# Patient Record
Sex: FEMALE | Race: BLACK OR AFRICAN AMERICAN | NOT HISPANIC OR LATINO | Employment: FULL TIME | URBAN - METROPOLITAN AREA
[De-identification: names, ages, dates, MRNs, and addresses within clinical notes are randomized per-mention and may not be internally consistent; named-entity substitution may affect disease eponyms.]

---

## 2017-05-02 PROBLEM — K21.9 GASTROESOPHAGEAL REFLUX DISEASE: Status: ACTIVE | Noted: 2017-05-02

## 2017-09-05 PROBLEM — M19.90 ARTHRITIS: Status: ACTIVE | Noted: 2017-09-05

## 2018-01-10 ENCOUNTER — CLINICAL SUPPORT (OUTPATIENT)
Dept: OCCUPATIONAL MEDICINE | Facility: CLINIC | Age: 63
End: 2018-01-10

## 2018-01-10 DIAGNOSIS — Z02.83 ENCOUNTER FOR DRUG SCREENING: ICD-10-CM

## 2018-01-10 PROCEDURE — 80305 DRUG TEST PRSMV DIR OPT OBS: CPT | Mod: S$GLB,,, | Performed by: PHYSICIAN ASSISTANT

## 2018-03-22 PROBLEM — R20.2 TINGLING: Status: ACTIVE | Noted: 2018-03-22

## 2018-12-22 ENCOUNTER — OFFICE VISIT (OUTPATIENT)
Dept: URGENT CARE | Facility: CLINIC | Age: 63
End: 2018-12-22

## 2018-12-22 VITALS
HEIGHT: 62 IN | SYSTOLIC BLOOD PRESSURE: 133 MMHG | BODY MASS INDEX: 40.85 KG/M2 | TEMPERATURE: 99 F | WEIGHT: 222 LBS | RESPIRATION RATE: 18 BRPM | OXYGEN SATURATION: 97 % | DIASTOLIC BLOOD PRESSURE: 70 MMHG | HEART RATE: 88 BPM

## 2018-12-22 DIAGNOSIS — J45.20 INTERMITTENT ASTHMA WITHOUT COMPLICATION, UNSPECIFIED ASTHMA SEVERITY: ICD-10-CM

## 2018-12-22 DIAGNOSIS — J40 BRONCHITIS: Primary | ICD-10-CM

## 2018-12-22 PROCEDURE — 99214 OFFICE O/P EST MOD 30 MIN: CPT | Mod: S$GLB,,, | Performed by: NURSE PRACTITIONER

## 2018-12-22 RX ORDER — BENZONATATE 200 MG/1
200 CAPSULE ORAL 3 TIMES DAILY PRN
Qty: 30 CAPSULE | Refills: 0 | Status: SHIPPED | OUTPATIENT
Start: 2018-12-22 | End: 2019-01-01

## 2018-12-22 RX ORDER — PROMETHAZINE HYDROCHLORIDE AND DEXTROMETHORPHAN HYDROBROMIDE 6.25; 15 MG/5ML; MG/5ML
5 SYRUP ORAL NIGHTLY PRN
Qty: 118 ML | Refills: 0 | Status: SHIPPED | OUTPATIENT
Start: 2018-12-22 | End: 2019-01-01

## 2018-12-22 RX ORDER — METHYLPREDNISOLONE 4 MG/1
TABLET ORAL
Qty: 1 PACKAGE | Refills: 0 | Status: SHIPPED | OUTPATIENT
Start: 2018-12-22 | End: 2020-03-21 | Stop reason: CLARIF

## 2018-12-22 NOTE — PATIENT INSTRUCTIONS
Follow up with your doctor in a few days.  Return to the urgent care or go to the ER if symptoms get worse.    CONTINUE ALBUTEROL NEBULIZER AND RESCUE INHALER AS DIRECTED.  START ORAL STEROIDS TOMORROW.  START USING ADVAIR DAILY AS DIRECTED.  MUCINEX DM (OR A MEDICINE WITH  GUAIFENESIN)   STAY HYDRATED.       What Is Acute Bronchitis?  Acute bronchitis is when the airways in your lungs (bronchial tubes) become red and swollen (inflamed). It is usually caused by a viral infection. But it can also occur because of a bacteria or allergen. Symptoms include a cough that produces yellow or greenish mucus and can last for days or sometimes weeks.  Inside healthy lungs    Air travels in and out of the lungs through the airways. The linings of these airways produce sticky mucus. This mucus traps particles that enter the lungs. Tiny structures called cilia then sweep the particles out of the airways.     Healthy airway: Airways are normally open. Air moves in and out easily.      Healthy cilia: Tiny, hairlike cilia sweep mucus and particles up and out of the airways.   Lungs with bronchitis  Bronchitis often occurs with a cold or the flu virus. The airways become inflamed (red and swollen). There is a deep hacking cough from the extra mucus. Other symptoms may include:  · Wheezing  · Chest discomfort  · Shortness of breath  · Mild fever  A second infection, this time due to bacteria, may then occur. And airways irritated by allergens or smoke are more likely to get infected.        Inflamed airway: Inflammation and extra mucus narrow the airway, causing shortness of breath.      Impaired cilia: Extra mucus impairs cilia, causing congestion and wheezing. Smoking makes the problem worse.   Making a diagnosis  A physical exam, health history, and certain tests help your healthcare provider make the diagnosis.  Health history  Your healthcare provider will ask you about your symptoms.  The exam  Your provider listens to your  chest for signs of congestion. He or she may also check your ears, nose, and throat.  Possible tests  · A sputum test for bacteria. This requires a sample of mucus from your lungs.  · A nasal or throat swab. This tests to see if you have a bacterial infection.  · A chest X-ray. This is done if your healthcare provider thinks you have pneumonia.  · Tests to check for an underlying condition. Other tests may be done to check for things such as allergies, asthma, or COPD (chronic obstructive pulmonary disease). You may need to see a specialist for more lung function testing.  Treating a cough  The main treatment for bronchitis is easing symptoms. Avoiding smoke, allergens, and other things that trigger coughing can often help. If the infection is bacterial, you may be given antibiotics. During the illness, it's important to get plenty of sleep. To ease symptoms:  · Dont smoke. Also avoid secondhand smoke.  · Use a humidifier. Or try breathing in steam from a hot shower. This may help loosen mucus.  · Drink a lot of water and juice. They can soothe the throat and may help thin mucus.  · Sit up or use extra pillows when in bed. This helps to lessen coughing and congestion.  · Ask your provider about using medicine. Ask about using cough medicine, pain and fever medicine, or a decongestant.  Antibiotics  Most cases of bronchitis are caused by cold or flu viruses. They dont need antibiotics to treat them, even if your mucus is thick and green or yellow. Antibiotics dont treat viral illness and antibiotics have not been shown to have any benefit in cases of acute bronchitis. Taking antibiotics when they are not needed increases your risk of getting an infection later that is antibiotic-resistant. Antibiotics can also cause severe cases of diarrhea that require other antibiotics to treat.  It is important that you accept your healthcare provider's opinion to not use antibiotics. Your provider will prescribe antibiotics if  the infection is caused by bacteria. If they are prescribed:  · Take all of the medicine. Take the medicine until it is used up, even if symptoms have improved. If you dont, the bronchitis may come back.  · Take the medicines as directed. For instance, some medicines should be taken with food.  · Ask about side effects. Ask your provider or pharmacist what side effects are common, and what to do about them.  Follow-up care  You should see your provider again in 2 to 3 weeks. By this time, symptoms should have improved. An infection that lasts longer may mean you have a more serious problem.  Prevention  · Avoid tobacco smoke. If you smoke, quit. Stay away from smoky places. Ask friends and family not to smoke around you, or in your home or car.  · Get checked for allergies.  · Ask your provider about getting a yearly flu shot. Also ask about pneumococcal or pneumonia shots.  · Wash your hands often. This helps reduce the chance of picking up viruses that cause colds and flu.  Call your healthcare provider if:  · Symptoms worsen, or you have new symptoms  · Breathing problems worsen or  become severe  · Symptoms dont get better within a week, or within 3 days of taking antibiotics   Date Last Reviewed: 2/1/2017  © 1018-0183 [employer]. [address] [place] [address], [place] [address]. All rights reserved. This information is not intended as a substitute for professional medical care. Always follow your healthcare professional's instructions.

## 2018-12-22 NOTE — PROGRESS NOTES
"Subjective:       Patient ID: Jeniffer Trimble is a 63 y.o. female.    Vitals:  height is 5' 2" (1.575 m) and weight is 100.7 kg (222 lb). Her temperature is 98.9 °F (37.2 °C). Her blood pressure is 133/70 and her pulse is 88. Her respiration is 18 and oxygen saturation is 97%.     Chief Complaint: Asthma    [facility] ASTHMA - REPORTS FLARES UP WITH URI. REPORTS USING HER ALBUTEROL NEBULIZER TREATMENT ABOUT 2-3 TIMES A DAY AND WAKING UP WITH COUGHING AT NIGHT. DENIES FEVER/CHILLS. DENIES BODY ACHES, SHORTNESS OF BREATH. REPORTS CHEST TIGHTNESS OCCASIONALLY. SHE HAS RX ADVAIR BUT HAS NOT BEEN TAKING IT.       Asthma   She complains of chest tightness, cough, sputum production and wheezing. There is no hemoptysis or shortness of breath. This is a new problem. The current episode started 1 to 4 weeks ago. Asthma causes daytime symptoms frequently. Asthma causes nighttime symptoms frequently. The problem has been gradually worsening. The cough is productive of sputum. Pertinent negatives include no ear pain, fever, myalgias or sore throat. Her symptoms are aggravated by nothing. Her symptoms are alleviated by nothing. Her past medical history is significant for asthma.       Constitution: Negative for chills, sweating, fatigue and fever.   HENT: Negative for ear pain, congestion, sinus pain, sinus pressure, sore throat and voice change.    Neck: Negative for painful lymph nodes.   Eyes: Negative for eye redness.   Respiratory: Positive for chest tightness, cough, sputum production and wheezing. Negative for bloody sputum, COPD, shortness of breath, stridor and asthma.    Gastrointestinal: Negative for nausea and vomiting.   Musculoskeletal: Negative for muscle ache.   Skin: Negative for rash.   Allergic/Immunologic: Negative for seasonal allergies and asthma.   Hematologic/Lymphatic: Negative for swollen lymph nodes.       Objective:      Physical Exam   Constitutional: She is oriented to person, place, and time. She appears " well-developed and well-nourished. She is cooperative.  Non-toxic appearance. She does not appear ill. No distress.   HENT:   Head: Normocephalic and atraumatic.   Right Ear: Hearing, tympanic membrane, external ear and ear canal normal.   Left Ear: Hearing, tympanic membrane, external ear and ear canal normal.   Nose: Mucosal edema and rhinorrhea present. No nasal deformity. No epistaxis. Right sinus exhibits no maxillary sinus tenderness and no frontal sinus tenderness. Left sinus exhibits no maxillary sinus tenderness and no frontal sinus tenderness.   Mouth/Throat: Uvula is midline and mucous membranes are normal. No trismus in the jaw. Normal dentition. No uvula swelling. Posterior oropharyngeal edema and posterior oropharyngeal erythema present.   Eyes: Conjunctivae and lids are normal. No scleral icterus.   Sclera clear bilat   Neck: Trachea normal, full passive range of motion without pain and phonation normal. Neck supple.   Cardiovascular: Normal rate, regular rhythm, normal heart sounds, intact distal pulses and normal pulses.   Pulses:       Radial pulses are 2+ on the right side, and 2+ on the left side.   Pulmonary/Chest: Effort normal. No respiratory distress. She has wheezes (FAINT EXPIRATORY) in the right upper field. She has no rhonchi. She has no rales.   Abdominal: Soft. Normal appearance and bowel sounds are normal. She exhibits no distension. There is no tenderness.   Musculoskeletal: Normal range of motion. She exhibits no edema or deformity.   Neurological: She is alert and oriented to person, place, and time. She exhibits normal muscle tone. Coordination normal.   Skin: Skin is warm, dry and intact. She is not diaphoretic. No pallor.   Psychiatric: She has a normal mood and affect. Her speech is normal and behavior is normal. Judgment and thought content normal. Cognition and memory are normal.   Nursing note and vitals reviewed.      Assessment:       1. Bronchitis    2. Intermittent asthma  without complication, unspecified asthma severity        Plan:         Bronchitis  -     methylPREDNISolone (MEDROL DOSEPACK) 4 mg tablet; use as directed  Dispense: 1 Package; Refill: 0  -     benzonatate (TESSALON) 200 MG capsule; Take 1 capsule (200 mg total) by mouth 3 (three) times daily as needed.  Dispense: 30 capsule; Refill: 0  -     promethazine-dextromethorphan (PROMETHAZINE-DM) 6.25-15 mg/5 mL Syrp; Take 5 mLs by mouth nightly as needed.  Dispense: 118 mL; Refill: 0    Intermittent asthma without complication, unspecified asthma severity      Patient Instructions     Follow up with your doctor in a few days.  Return to the urgent care or go to the ER if symptoms get worse.    CONTINUE ALBUTEROL NEBULIZER AND RESCUE INHALER AS DIRECTED.  START ORAL STEROIDS TOMORROW.  START USING ADVAIR DAILY AS DIRECTED.  MUCINEX DM (OR A MEDICINE WITH  GUAIFENESIN)   STAY HYDRATED.       What Is Acute Bronchitis?  Acute bronchitis is when the airways in your lungs (bronchial tubes) become red and swollen (inflamed). It is usually caused by a viral infection. But it can also occur because of a bacteria or allergen. Symptoms include a cough that produces yellow or greenish mucus and can last for days or sometimes weeks.  Inside healthy lungs    Air travels in and out of the lungs through the airways. The linings of these airways produce sticky mucus. This mucus traps particles that enter the lungs. Tiny structures called cilia then sweep the particles out of the airways.     Healthy airway: Airways are normally open. Air moves in and out easily.      Healthy cilia: Tiny, hairlike cilia sweep mucus and particles up and out of the airways.   Lungs with bronchitis  Bronchitis often occurs with a cold or the flu virus. The airways become inflamed (red and swollen). There is a deep hacking cough from the extra mucus. Other symptoms may include:  · Wheezing  · Chest discomfort  · Shortness of breath  · Mild fever  A second  infection, this time due to bacteria, may then occur. And airways irritated by allergens or smoke are more likely to get infected.        Inflamed airway: Inflammation and extra mucus narrow the airway, causing shortness of breath.      Impaired cilia: Extra mucus impairs cilia, causing congestion and wheezing. Smoking makes the problem worse.   Making a diagnosis  A physical exam, health history, and certain tests help your healthcare provider make the diagnosis.  Health history  Your healthcare provider will ask you about your symptoms.  The exam  Your provider listens to your chest for signs of congestion. He or she may also check your ears, nose, and throat.  Possible tests  · A sputum test for bacteria. This requires a sample of mucus from your lungs.  · A nasal or throat swab. This tests to see if you have a bacterial infection.  · A chest X-ray. This is done if your healthcare provider thinks you have pneumonia.  · Tests to check for an underlying condition. Other tests may be done to check for things such as allergies, asthma, or COPD (chronic obstructive pulmonary disease). You may need to see a specialist for more lung function testing.  Treating a cough  The main treatment for bronchitis is easing symptoms. Avoiding smoke, allergens, and other things that trigger coughing can often help. If the infection is bacterial, you may be given antibiotics. During the illness, it's important to get plenty of sleep. To ease symptoms:  · Dont smoke. Also avoid secondhand smoke.  · Use a humidifier. Or try breathing in steam from a hot shower. This may help loosen mucus.  · Drink a lot of water and juice. They can soothe the throat and may help thin mucus.  · Sit up or use extra pillows when in bed. This helps to lessen coughing and congestion.  · Ask your provider about using medicine. Ask about using cough medicine, pain and fever medicine, or a decongestant.  Antibiotics  Most cases of bronchitis are caused by  cold or flu viruses. They dont need antibiotics to treat them, even if your mucus is thick and green or yellow. Antibiotics dont treat viral illness and antibiotics have not been shown to have any benefit in cases of acute bronchitis. Taking antibiotics when they are not needed increases your risk of getting an infection later that is antibiotic-resistant. Antibiotics can also cause severe cases of diarrhea that require other antibiotics to treat.  It is important that you accept your healthcare provider's opinion to not use antibiotics. Your provider will prescribe antibiotics if the infection is caused by bacteria. If they are prescribed:  · Take all of the medicine. Take the medicine until it is used up, even if symptoms have improved. If you dont, the bronchitis may come back.  · Take the medicines as directed. For instance, some medicines should be taken with food.  · Ask about side effects. Ask your provider or pharmacist what side effects are common, and what to do about them.  Follow-up care  You should see your provider again in 2 to 3 weeks. By this time, symptoms should have improved. An infection that lasts longer may mean you have a more serious problem.  Prevention  · Avoid tobacco smoke. If you smoke, quit. Stay away from smoky places. Ask friends and family not to smoke around you, or in your home or car.  · Get checked for allergies.  · Ask your provider about getting a yearly flu shot. Also ask about pneumococcal or pneumonia shots.  · Wash your hands often. This helps reduce the chance of picking up viruses that cause colds and flu.  Call your healthcare provider if:  · Symptoms worsen, or you have new symptoms  · Breathing problems worsen or  become severe  · Symptoms dont get better within a week, or within 3 days of taking antibiotics   Date Last Reviewed: 2/1/2017  © 5605-4495 [employer]. [address] [place] [address], [place] [address]. All rights reserved. This information is not  intended as a substitute for professional medical care. Always follow your healthcare professional's instructions.

## 2020-02-10 ENCOUNTER — HOSPITAL ENCOUNTER (EMERGENCY)
Facility: OTHER | Age: 65
Discharge: HOME | End: 2020-02-10
Attending: EMERGENCY MEDICINE
Payer: COMMERCIAL

## 2020-02-10 VITALS
WEIGHT: 240 LBS | RESPIRATION RATE: 18 BRPM | OXYGEN SATURATION: 100 % | SYSTOLIC BLOOD PRESSURE: 136 MMHG | TEMPERATURE: 98 F | HEART RATE: 67 BPM | BODY MASS INDEX: 47.12 KG/M2 | DIASTOLIC BLOOD PRESSURE: 65 MMHG | HEIGHT: 60 IN

## 2020-02-10 DIAGNOSIS — G89.29 CHRONIC RIGHT HIP PAIN: Primary | ICD-10-CM

## 2020-02-10 DIAGNOSIS — E87.6 HYPOKALEMIA: ICD-10-CM

## 2020-02-10 DIAGNOSIS — M25.551 CHRONIC RIGHT HIP PAIN: Primary | ICD-10-CM

## 2020-02-10 LAB
ALBUMIN SERPL BCP-MCNC: 4.1 G/DL (ref 3.5–5.2)
ALP SERPL-CCNC: 90 U/L (ref 55–135)
ALT SERPL W/O P-5'-P-CCNC: 20 U/L (ref 10–44)
ANION GAP SERPL CALC-SCNC: 10 MMOL/L (ref 8–16)
AST SERPL-CCNC: 17 U/L (ref 10–40)
BASOPHILS # BLD AUTO: 0.01 K/UL (ref 0–0.2)
BASOPHILS NFR BLD: 0.1 % (ref 0–1.9)
BILIRUB SERPL-MCNC: 0.2 MG/DL (ref 0.1–1)
BUN SERPL-MCNC: 18 MG/DL (ref 8–23)
CALCIUM SERPL-MCNC: 8.9 MG/DL (ref 8.7–10.5)
CHLORIDE SERPL-SCNC: 103 MMOL/L (ref 95–110)
CO2 SERPL-SCNC: 27 MMOL/L (ref 23–29)
CREAT SERPL-MCNC: 0.8 MG/DL (ref 0.5–1.4)
DIFFERENTIAL METHOD: ABNORMAL
EOSINOPHIL # BLD AUTO: 0.1 K/UL (ref 0–0.5)
EOSINOPHIL NFR BLD: 0.9 % (ref 0–8)
ERYTHROCYTE [DISTWIDTH] IN BLOOD BY AUTOMATED COUNT: 17 % (ref 11.5–14.5)
EST. GFR  (AFRICAN AMERICAN): >60 ML/MIN/1.73 M^2
EST. GFR  (NON AFRICAN AMERICAN): >60 ML/MIN/1.73 M^2
GLUCOSE SERPL-MCNC: 70 MG/DL (ref 70–110)
HCT VFR BLD AUTO: 33.7 % (ref 37–48.5)
HGB BLD-MCNC: 10.4 G/DL (ref 12–16)
IMM GRANULOCYTES # BLD AUTO: 0.04 K/UL (ref 0–0.04)
IMM GRANULOCYTES NFR BLD AUTO: 0.3 % (ref 0–0.5)
LYMPHOCYTES # BLD AUTO: 3.4 K/UL (ref 1–4.8)
LYMPHOCYTES NFR BLD: 29.7 % (ref 18–48)
MCH RBC QN AUTO: 20 PG (ref 27–31)
MCHC RBC AUTO-ENTMCNC: 30.9 G/DL (ref 32–36)
MCV RBC AUTO: 65 FL (ref 82–98)
MONOCYTES # BLD AUTO: 0.9 K/UL (ref 0.3–1)
MONOCYTES NFR BLD: 7.6 % (ref 4–15)
NEUTROPHILS # BLD AUTO: 7 K/UL (ref 1.8–7.7)
NEUTROPHILS NFR BLD: 61.4 % (ref 38–73)
NRBC BLD-RTO: 0 /100 WBC
PLATELET # BLD AUTO: 372 K/UL (ref 150–350)
PMV BLD AUTO: 10.8 FL (ref 9.2–12.9)
POTASSIUM SERPL-SCNC: 3.1 MMOL/L (ref 3.5–5.1)
PROT SERPL-MCNC: 8 G/DL (ref 6–8.4)
RBC # BLD AUTO: 5.21 M/UL (ref 4–5.4)
SODIUM SERPL-SCNC: 140 MMOL/L (ref 136–145)
WBC # BLD AUTO: 11.43 K/UL (ref 3.9–12.7)

## 2020-02-10 PROCEDURE — 85025 COMPLETE CBC W/AUTO DIFF WBC: CPT

## 2020-02-10 PROCEDURE — 99283 EMERGENCY DEPT VISIT LOW MDM: CPT | Mod: 25

## 2020-02-10 PROCEDURE — 80053 COMPREHEN METABOLIC PANEL: CPT

## 2020-02-10 RX ORDER — DICLOFENAC SODIUM 10 MG/G
2 GEL TOPICAL 4 TIMES DAILY
Qty: 100 G | Refills: 0 | Status: SHIPPED | OUTPATIENT
Start: 2020-02-10

## 2020-02-11 NOTE — DISCHARGE INSTRUCTIONS
Take 40 mEq of your potassium supplement for the next 2 days, and then 20 mEq for 2 more days.  Follow up with your primary care for further recommendation.

## 2020-02-11 NOTE — ED PROVIDER NOTES
Encounter Date: 2/10/2020       History     Chief Complaint   Patient presents with    Hip Pain     pt with c/ lefthip apin going down leg to toes. pt seen inurgent care without relief.     Patient is a 65 y.o. female with HTN, CHF, and Arthritis who presents for emergent evaluation of R hip pain which radiates down her leg. Patient states the pain is worsened when standing or sitting for long periods of time. This is a chronic issue managed by her PCP who has prescribed her Tramadol in the past, but she states it has worsened in the last two weeks. She says she was seen at an urgent care over the weekend who gave her a steroid injection without any change in her sxs. She is aware that this issue is a degenerative process and has plans to be seen by an Orthopedist.  Patient states she has been taking high doses of Motrin and Tylenol daily.  Denies any weakness, saddle anesthesia, urinary or GI changes, or back pain.     The history is provided by the patient.     Review of patient's allergies indicates:   Allergen Reactions    Percocet [oxycodone-acetaminophen] Hives and Itching    Erythromycin Nausea And Vomiting and Palpitations     Past Medical History:   Diagnosis Date    Anemia     Asthma     CHF (congestive heart failure)     Hypertension      Past Surgical History:   Procedure Laterality Date    HYSTERECTOMY      ROTATOR CUFF REPAIR      TOTAL KNEE ARTHROPLASTY       Family History   Problem Relation Age of Onset    Hypertension Mother     Dementia Mother     Arthritis Mother     Asthma Father      Social History     Tobacco Use    Smoking status: Never Smoker   Substance Use Topics    Alcohol use: No     Alcohol/week: 0.0 standard drinks    Drug use: No     Review of Systems   Constitutional: Negative for chills.   Genitourinary: Negative for difficulty urinating.   Musculoskeletal: Positive for arthralgias and myalgias.   Skin: Negative for rash.   Allergic/Immunologic: Negative for  immunocompromised state.   Neurological: Negative for weakness and numbness.       Physical Exam     Initial Vitals [02/10/20 1750]   BP Pulse Resp Temp SpO2   (!) 141/86 93 18 98.1 °F (36.7 °C) 97 %      MAP       --         Physical Exam    Constitutional: Vital signs are normal. She is cooperative.   HENT:   Head: Normocephalic and atraumatic.   Eyes: Conjunctivae and EOM are normal.   Neck: Normal range of motion. Neck supple.   Cardiovascular: Normal rate, regular rhythm and intact distal pulses.   Pulmonary/Chest: Breath sounds normal. No respiratory distress.   Musculoskeletal:   No CT L midline tenderness, crepitus, masses, step-offs or deformities.  Mild tenderness overlying the right hip without skin changes.  Patient reports pain with manipulation (internal and external rotation) of right hip   Neurological: She is alert and oriented to person, place, and time. She has normal strength. No sensory deficit. GCS eye subscore is 4. GCS verbal subscore is 5. GCS motor subscore is 6.   Skin: Skin is warm and dry. No rash noted.   Psychiatric: She has a normal mood and affect. Her behavior is normal. Thought content normal.         ED Course   Procedures  Labs Reviewed   CBC W/ AUTO DIFFERENTIAL - Abnormal; Notable for the following components:       Result Value    Hemoglobin 10.4 (*)     Hematocrit 33.7 (*)     Mean Corpuscular Volume 65 (*)     Mean Corpuscular Hemoglobin 20.0 (*)     Mean Corpuscular Hemoglobin Conc 30.9 (*)     RDW 17.0 (*)     Platelets 372 (*)     All other components within normal limits   COMPREHENSIVE METABOLIC PANEL - Abnormal; Notable for the following components:    Potassium 3.1 (*)     All other components within normal limits          Imaging Results          X-Ray Hip 2 View Right (Final result)  Result time 02/10/20 19:09:58    Final result by Jorge Cabello MD (02/10/20 19:09:58)                 Impression:      No evidence of fracture.      Electronically signed  by: Jorge Cabello MD  Date:    02/10/2020  Time:    19:09             Narrative:    EXAMINATION:  XR HIP 2 VIEW RIGHT    CLINICAL HISTORY:  Pain in unspecified hip    TECHNIQUE:  AP view of the pelvis and frog leg lateral view of the right hip were performed.    COMPARISON:  None    FINDINGS:  Degenerative changes of the hips bilaterally.  No evidence of acute fracture or dislocation.  No soft tissue abnormality.                                 Medical Decision Making:   Initial Assessment:   Urgent evaluation of a 65 y.o. female presenting to the emergency department complaining of atraumatic exacerbation of chronic right hip pain. Patient is afebrile, nontoxic appearing and hemodynamically stable.  Patient has history of chronic right hip pain.  Not relieved with NSAIDs.  There is no signs of septic joint.  Blood work and imaging was obtained from [facility].  Clinical Tests:   Lab Tests: Ordered and Reviewed  Radiological Study: Ordered and Reviewed  ED Management:  Hip x-ray reveals degenerative changes of the hips bilaterally.  Blood work significant form hypokalemia of 3.1, likely secondary to patient taking diuretic.  She states she has oral potassium at home but does not take this.  She is encouraged to take replacement over the next 5 days and follow up with primary care is needed.  Patient will be sent home with Voltaren gel.  Patient was educated on treating chronic pain from the emergency department.  She is advised follow up with her primary care provider.                    ED Course as of Feb 11 0117   Mon Feb 10, 2020   1827 Jeniffer Trimble, 65 y.o.  presented to the ED complaining of right hip pain radiating down the right leg with tingling in the foot. She reports no fever, chills, nausea, vomiting, chest pain or SOB. She reports that she was at  one week ago and was given Im steroid and toradol. She reports that this did not help at all. She has been taking almost 4 g of tylenol a day and 3  about grams of ibuprofen a day. She reports she has been taking this regimen for about 2 months. She is unaccompanied in the ER. Of note she denies trauma or injury to the left hip.     Patient seen and medically screened by myself in the Provider in Triage Sort system due to ED crowding.  Appropriate tests and/or medications ordered.  A medical screening exam has been performed.  The care will be assumed by myself or another provider when treatment space becomes available.  I am not assuming care of this patient at this time. 6:27 PM. GERALD ADAMS        [AM]      ED Course User Index  [AM] Danna Yoon PA-C                Clinical Impression:     1. Chronic right hip pain    2. Hypokalemia                            Timbo Mcmillan PA-C  02/11/20 0124

## 2020-02-11 NOTE — ED TRIAGE NOTES
Patient presents to ER with c/o Right hip pain radiating to Right leg with numbness and tingling in toes for about a week.  Patient reports pain 8/10.  Patient states she was seen at urgent care last Saturday and was given a steroid injection and tramadol.  Patient states the medication didn't help at all.  Patient states she was told by her PCP that she has arthritis in the past.

## 2020-12-08 ENCOUNTER — OFFICE VISIT (OUTPATIENT)
Dept: URGENT CARE | Facility: CLINIC | Age: 65
End: 2020-12-08
Payer: OTHER MISCELLANEOUS

## 2020-12-08 VITALS
HEART RATE: 69 BPM | BODY MASS INDEX: 47.13 KG/M2 | HEIGHT: 60 IN | DIASTOLIC BLOOD PRESSURE: 77 MMHG | TEMPERATURE: 98 F | OXYGEN SATURATION: 96 % | SYSTOLIC BLOOD PRESSURE: 144 MMHG | WEIGHT: 240.06 LBS
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
## 2020-12-08 PROCEDURE — 86592 SYPHILIS TEST NON-TREP QUAL: CPT | Mod: TIER,S$GLB,, | Performed by: PHYSICIAN ASSISTANT

## 2020-12-08 PROCEDURE — 86592 RPR: ICD-10-PCS | Mod: TIER,S$GLB,, | Performed by: PHYSICIAN ASSISTANT

## 2020-12-08 PROCEDURE — 80074 ACUTE HEPATITIS PANEL: CPT | Mod: TIER,S$GLB,, | Performed by: PHYSICIAN ASSISTANT

## 2020-12-08 PROCEDURE — 80074 HEPATITIS PANEL, ACUTE: ICD-10-PCS | Mod: TIER,S$GLB,, | Performed by: PHYSICIAN ASSISTANT

## 2020-12-08 PROCEDURE — 80074 ACUTE HEPATITIS PANEL: CPT

## 2020-12-08 PROCEDURE — 90715 TDAP VACCINE 7 YRS/> IM: CPT | Mod: TIER,S$GLB,, | Performed by: PHYSICIAN ASSISTANT

## 2020-12-08 PROCEDURE — 99203 OFFICE O/P NEW LOW 30 MIN: CPT | Mod: TIER,25,S$GLB, | Performed by: PHYSICIAN ASSISTANT

## 2020-12-08 PROCEDURE — 86592 SYPHILIS TEST NON-TREP QUAL: CPT

## 2020-12-08 PROCEDURE — 90471 IMMUNIZATION ADMIN: CPT | Mod: TIER,S$GLB,VFC, | Performed by: PHYSICIAN ASSISTANT

## 2020-12-08 PROCEDURE — 86703 HIV-1/HIV-2 1 RESULT ANTBDY: CPT

## 2020-12-08 PROCEDURE — 87389 HIV-1 AG W/HIV-1&-2 AB AG IA: CPT | Mod: QW,TIER,S$GLB, | Performed by: PHYSICIAN ASSISTANT

## 2020-12-08 PROCEDURE — 87389 HIV 1 / 2 ANTIBODY: ICD-10-PCS | Mod: QW,TIER,S$GLB, | Performed by: PHYSICIAN ASSISTANT

## 2020-12-08 PROCEDURE — 99203 PR OFFICE/OUTPT VISIT, NEW, LEVL III, 30-44 MIN: ICD-10-PCS | Mod: TIER,25,S$GLB, | Performed by: PHYSICIAN ASSISTANT

## 2020-12-08 PROCEDURE — 90715 TDAP VACCINE GREATER THAN OR EQUAL TO 7YO IM: ICD-10-PCS | Mod: TIER,S$GLB,, | Performed by: PHYSICIAN ASSISTANT

## 2020-12-08 PROCEDURE — 90471 TDAP VACCINE GREATER THAN OR EQUAL TO 7YO IM: ICD-10-PCS | Mod: TIER,S$GLB,VFC, | Performed by: PHYSICIAN ASSISTANT

## 2020-12-09 LAB
HAV IGM SERPL QL IA: NEGATIVE
HBV CORE IGM SERPL QL IA: NEGATIVE
HBV SURFACE AG SERPL QL IA: NEGATIVE
HCV AB SERPL QL IA: NEGATIVE
HIV 1+2 AB+HIV1 P24 AG SERPL QL IA: NEGATIVE
RPR SER QL: NORMAL

## 2020-12-09 NOTE — PATIENT INSTRUCTIONS
- Rest.    - Drink plenty of fluids.    - Acetaminophen (tylenol) or Ibuprofen (advil,motrin) as directed as needed for fever/pain. Avoid tylenol if you have a history of liver disease. Do not take ibuprofen if you have a history of GI bleeding, kidney disease, or if you take blood thinners.     - return for follow up blood draw in 6 weeks for RPR, HIV, hepatitis  - return again at 3 months and 6 months for repeat blood draw for HIV     - watch for signs of infection including increased redness, swelling, discharge, increased pain, red streaking, fever.  Seek medical attention immediately if these occur.    - Follow up with your PCP or specialty clinic as directed in the next 1-2 weeks if not improved or as needed.  You can call [phone] to schedule an appointment with the appropriate provider.    - Go to the ER or seek medical attention immediately if you develop new or worsening symptoms.    - You must understand that you have received an Urgent Care treatment only and that you may be released before all of your medical problems are known or treated.   - You, the patient, will arrange for follow up care as instructed.   - If your condition worsens or fails to improve we recommend that you receive another evaluation at the ER immediately or contact your PCP to discuss your concerns or return here.       Standard Precautions: Needles and Other Sharps  Used needles, lancets, blades, and other sharp devices (known as sharps) can cut or prick you. This can expose you to bloodborne germs. Take time to handle sharps safely.  Handling sharps safely  Always move carefully while handling sharps. To prevent exposure to blood and OPIM (other potentially infectious materials):  · Never throw a sharp into the trash. And dont put a used sharp down. Dispose of it in a marked sharps container as soon as youre done with it.  · Dont bend, break, or recap needles. Never remove used needles from disposable syringes.  · Get  help before using sharps around confused or uncooperative patients.  · Make sure used sharps dont get left in linens or on bedside tables.  · Never clean up broken glass by hand.  Using sharps containers  Containers for the disposal of sharps will be provided by your facility. These containers must be puncture-proof and leakproof. They must be clearly marked with a biohazard label. Follow these tips for safe use of sharps containers:  · Never overfill a sharps container. Dispose of sharps containers according to your facilitys guidelines when theyre 2/3 full.  · Never force a sharp into a sharps container. Be careful and watch as you place sharps into the container.  · Never reach into a sharps container.  · Never open, empty, or reuse a sharps container.  Reporting a sharps exposure  Even when using standard precautions, you may be exposed to bloodborne pathogens on the job. Know the guidelines stated in your facilitys exposure control plan. These guidelines must be followed in cases of sharps exposures, splashes or sprays of blood or OPIM, or other exposures. If you have a sharps exposure:  · Wash the area well with soap and water. If your eyes are exposed, rinse them well with water only (dont use soap).  · Get medical attention right away. Time can be crucial in preventing infection. Your blood may need to be tested for HBV, HCV, and HIV. You may also receive vaccinations or post-exposure treatment to reduce your chances of becoming infected.  · Dont try to evaluate your own exposure.  · Report the exposure to your supervisor or other facility personnel. The patient whose blood or OPIM you were exposed to (if this is known) can be tested for a bloodborne infection. This helps determine whether you are at risk.   Date Last Reviewed: 2/6/2016  © 9781-7076 [employer]. [address] [place] [address], [place] [address]. All rights reserved. This information is not intended as a substitute for  professional medical care. Always follow your healthcare professional's instructions.

## 2020-12-09 NOTE — PROGRESS NOTES
Subjective:       Patient ID: Jeniffer Trimble is a 65 y.o. female.    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 Pt had a seizure causing her to be stuck with needle in Right index finger. Patient states her finger bled and she washed it with soap and water. The patient receiving the injection had no communicable diseases, per pt. Ms. Trimble does not remember when last tetanus shot was.       Constitution: Negative for fatigue.   HENT: Negative for facial swelling and facial trauma.    Neck: Negative for neck pain, neck stiffness and painful lymph nodes.   Cardiovascular: Negative for chest trauma.   Eyes: Negative for eye trauma, double vision and blurred vision.   Gastrointestinal: Negative for abdominal trauma, abdominal pain and rectal bleeding.   Genitourinary: Negative for hematuria, missed menses, genital trauma and pelvic pain.   Musculoskeletal: Negative for pain, trauma, joint swelling and abnormal ROM of joint.   Skin: Positive for puncture wound. Negative for color change, wound, abrasion, laceration and bruising.   Neurological: Negative for dizziness, history of vertigo, light-headedness, coordination disturbances, altered mental status and loss of consciousness.   Hematologic/Lymphatic: Negative for swollen lymph nodes and history of bleeding disorder.   Psychiatric/Behavioral: Negative for altered mental status.        Objective:      Physical Exam  Vitals signs and nursing note reviewed.   Constitutional:       General: She is not in acute distress.     Appearance: Normal appearance. She is not ill-appearing, toxic-appearing or diaphoretic.   HENT:      Head: Normocephalic.      Right Ear: Hearing and external ear normal.      Left Ear: Hearing and external ear normal.   Eyes:      General: Lids are normal. Gaze aligned appropriately. No scleral icterus.     Extraocular Movements: Extraocular movements  intact.      Conjunctiva/sclera: Conjunctivae normal.   Neck:      Musculoskeletal: Normal range of motion. No edema or neck rigidity.   Abdominal:      General: Abdomen is flat.   Musculoskeletal:      Right wrist: Normal.      Right hand: Normal.        Hands:    Neurological:      Mental Status: She is alert.         Assessment:       1. Accident caused by hypodermic needle, initial encounter    2. Work related injury        Plan:       Patient Instructions     - Rest.    - Drink plenty of fluids.    - Acetaminophen (tylenol) or Ibuprofen (advil,motrin) as directed as needed for fever/pain. Avoid tylenol if you have a history of liver disease. Do not take ibuprofen if you have a history of GI bleeding, kidney disease, or if you take blood thinners.     - return for follow up blood draw in 6 weeks for RPR, HIV, hepatitis  - return again at 3 months and 6 months for repeat blood draw for HIV     - watch for signs of infection including increased redness, swelling, discharge, increased pain, red streaking, fever.  Seek medical attention immediately if these occur.    - Follow up with your PCP or specialty clinic as directed in the next 1-2 weeks if not improved or as needed.  You can call [phone] to schedule an appointment with the appropriate provider.    - Go to the ER or seek medical attention immediately if you develop new or worsening symptoms.    - You must understand that you have received an Urgent Care treatment only and that you may be released before all of your medical problems are known or treated.   - You, the patient, will arrange for follow up care as instructed.   - If your condition worsens or fails to improve we recommend that you receive another evaluation at the ER immediately or contact your PCP to discuss your concerns or return here.       Standard Precautions: Needles and Other Sharps  Used needles, lancets, blades, and other sharp devices (known as sharps) can cut or prick you. This  can expose you to bloodborne germs. Take time to handle sharps safely.  Handling sharps safely  Always move carefully while handling sharps. To prevent exposure to blood and OPIM (other potentially infectious materials):  · Never throw a sharp into the trash. And dont put a used sharp down. Dispose of it in a marked sharps container as soon as youre done with it.  · Dont bend, break, or recap needles. Never remove used needles from disposable syringes.  · Get help before using sharps around confused or uncooperative patients.  · Make sure used sharps dont get left in linens or on bedside tables.  · Never clean up broken glass by hand.  Using sharps containers  Containers for the disposal of sharps will be provided by your facility. These containers must be puncture-proof and leakproof. They must be clearly marked with a biohazard label. Follow these tips for safe use of sharps containers:  · Never overfill a sharps container. Dispose of sharps containers according to your facilitys guidelines when theyre 2/3 full.  · Never force a sharp into a sharps container. Be careful and watch as you place sharps into the container.  · Never reach into a sharps container.  · Never open, empty, or reuse a sharps container.  Reporting a sharps exposure  Even when using standard precautions, you may be exposed to bloodborne pathogens on the job. Know the guidelines stated in your facilitys exposure control plan. These guidelines must be followed in cases of sharps exposures, splashes or sprays of blood or OPIM, or other exposures. If you have a sharps exposure:  · Wash the area well with soap and water. If your eyes are exposed, rinse them well with water only (dont use soap).  · Get medical attention right away. Time can be crucial in preventing infection. Your blood may need to be tested for HBV, HCV, and HIV. You may also receive vaccinations or post-exposure treatment to reduce your chances of becoming  infected.  · Dont try to evaluate your own exposure.  · Report the exposure to your supervisor or other facility personnel. The patient whose blood or OPIM you were exposed to (if this is known) can be tested for a bloodborne infection. This helps determine whether you are at risk.   Date Last Reviewed: 2/6/2016  © 7634-2227 [employer]. [address] [place] [address]. All rights reserved. This information is not intended as a substitute for professional medical care. Always follow your healthcare professional's instructions.                      No follow-ups on file.

## 2021-04-16 ENCOUNTER — PATIENT MESSAGE (OUTPATIENT)
Dept: RESEARCH | Facility: HOSPITAL | Age: 66
End: 2021-04-16

## 2024-11-19 NOTE — MEDICAL/APP STUDENT
NAME:  Jeniffer Trimble  MRN:    [id number]  ADMIT DATE: 2/10/2020        EMERGENCY DEPARTMENT ENCOUNTER    CHIEF COMPLAINT    Chief Complaint   Patient presents with    Hip Pain     pt with c/ lefthip apin going down leg to toes. pt seen inurgent care without relief.       HPI    This is a 65 y.o. female with HTN, CHF, and Arthritis who presents for emergent evaluation of R hip pain which radiates down her leg with associated paresthesia in her toes. Patient states the pain is worsened when standing or sitting for long periods of time. This is a chronic issue managed by her PCP who has prescribed her Tramadol in the past, but she states it has worsened in the last two weeks. She says she was seen at an urgent care over the weekend who gave her a steroid injection without any change in her sxs. She is aware that this issue is a degenerative process and has plans to be seen by an Orthopedist. Denies any weakness, saddle anesthesia, urinary or GI changes, or back pain.         ALLERGIES    Review of patient's allergies indicates:   Allergen Reactions    Percocet [oxycodone-acetaminophen] Hives and Itching    Erythromycin Nausea And Vomiting and Palpitations         PAST MEDICAL HISTORY  Past Medical History:   Diagnosis Date    Anemia     Asthma     CHF (congestive heart failure)     Hypertension          SURGICAL HISTORY    Past Surgical History:   Procedure Laterality Date    HYSTERECTOMY      ROTATOR CUFF REPAIR      TOTAL KNEE ARTHROPLASTY           SOCIAL HISTORY    Social History     Socioeconomic History    Marital status:      Spouse name: Not on file    Number of children: Not on file    Years of education: Not on file    Highest education level: Not on file   Occupational History    Not on file   Social Needs    Financial resource strain: Not on file    Food insecurity:     Worry: Not on file     Inability: Not on file    Transportation needs:     Medical: Not on file     Non-medical:  Not on file   Tobacco Use    Smoking status: Never Smoker   Substance and Sexual Activity    Alcohol use: No     Alcohol/week: 0.0 standard drinks    Drug use: No    Sexual activity: Yes   Lifestyle    Physical activity:     Days per week: Not on file     Minutes per session: Not on file    Stress: Not on file   Relationships    Social connections:     Talks on phone: Not on file     Gets together: Not on file     Attends Oriental orthodox service: Not on file     Active member of club or organization: Not on file     Attends meetings of clubs or organizations: Not on file     Relationship status: Not on file   Other Topics Concern    Not on file   Social History Narrative    Not on file         FAMILY HISTORY    Family History   Problem Relation Age of Onset    Hypertension Mother     Dementia Mother     Arthritis Mother     Asthma Father          REVIEW OF SYSTEMS   Review of Systems   Constitutional: Negative for chills and fever.   HENT: Negative for congestion, sinus pain and tinnitus.    Eyes: Negative for blurred vision and double vision.   Respiratory: Negative for cough and shortness of breath.    Cardiovascular: Negative for chest pain and palpitations.   Gastrointestinal: Negative for constipation, diarrhea, nausea and vomiting.   Genitourinary: Negative for dysuria and frequency.   Musculoskeletal: Positive for joint pain.        R hip pain    Skin: Negative for itching and rash.   Neurological: Negative for sensory change, weakness and headaches.   Psychiatric/Behavioral: Negative.      All Systems otherwise negative except as noted in the History of Present Illness.        PHYSICAL EXAM    Reviewed Triage Note    VITAL SIGNS:   ED Triage Vitals [02/10/20 1750]   Enc Vitals Group      BP (!) 141/86      Pulse 93      Resp 18      Temp 98.1 °F (36.7 °C)      Temp src Oral      SpO2 97 %      Weight 240 lb      Height 5'      Head Circumference       Peak Flow       Pain Score       Pain Loc        Pain Edu?       Excl. in GC?        Patient Vitals for the past 24 hrs:   BP Temp Temp src Pulse Resp SpO2 Height Weight   02/10/20 1750 (!) 141/86 98.1 °F (36.7 °C) Oral 93 18 97 % 5' (1.524 m) 108.9 kg (240 lb)         Physical Exam   Constitutional: She is oriented to person, place, and time and well-developed, well-nourished, and in no distress. No distress.   HENT:   Head: Normocephalic and atraumatic.   Eyes: Pupils are equal, round, and reactive to light. Conjunctivae and EOM are normal.   Neck: Normal range of motion. Neck supple.   Cardiovascular: Normal rate and regular rhythm. Exam reveals no gallop and no friction rub.   No murmur heard.  Pulmonary/Chest: Effort normal and breath sounds normal.   Abdominal: Soft. Bowel sounds are normal. There is no tenderness.   Musculoskeletal: She exhibits tenderness.   R hip pain with normal ROM and strength    Neurological: She is alert and oriented to person, place, and time. She has normal reflexes. Gait normal. GCS score is 15.   Skin: Skin is warm and dry. She is not diaphoretic.   Psychiatric: Mood, memory, affect and judgment normal.              LABS  Pertinent labs reviewed. (See chart for details)   Labs Reviewed   CBC W/ AUTO DIFFERENTIAL - Abnormal; Notable for the following components:       Result Value    Hemoglobin 10.4 (*)     Hematocrit 33.7 (*)     Mean Corpuscular Volume 65 (*)     Mean Corpuscular Hemoglobin 20.0 (*)     Mean Corpuscular Hemoglobin Conc 30.9 (*)     RDW 17.0 (*)     Platelets 372 (*)     All other components within normal limits   COMPREHENSIVE METABOLIC PANEL - Abnormal; Notable for the following components:    Potassium 3.1 (*)     All other components within normal limits         RADIOLOGY    Imaging Results          X-Ray Hip 2 View Right (Final result)  Result time 02/10/20 19:09:58    Final result by Jorge Cabello MD (02/10/20 19:09:58)                 Impression:      No evidence of fracture.      Electronically  signed by: Jorge Cabello MD  Date:    02/10/2020  Time:    19:09             Narrative:    EXAMINATION:  XR HIP 2 VIEW RIGHT    CLINICAL HISTORY:  Pain in unspecified hip    TECHNIQUE:  AP view of the pelvis and frog leg lateral view of the right hip were performed.    COMPARISON:  None    FINDINGS:  Degenerative changes of the hips bilaterally.  No evidence of acute fracture or dislocation.  No soft tissue abnormality.                                    PROCEDURES    Procedures        ED COURSE & MEDICAL DECISION MAKING    Pertinent & Imaging studies reviewed. (See chart for details and specific orders.)  ED Course as of Feb 10 1948   Mon Feb 10, 2020   1827 Jeniffer Trimble, 65 y.o.  presented to the ED complaining of right hip pain radiating down the right leg with tingling in the foot. She reports no fever, chills, nausea, vomiting, chest pain or SOB. She reports that she was at  one week ago and was given Im steroid and toradol. She reports that this did not help at all. She has been taking almost 4 g of tylenol a day and 3 about grams of ibuprofen a day. She reports she has been taking this regimen for about 2 months. She is unaccompanied in the ER. Of note she denies trauma or injury to the left hip.     Patient seen and medically screened by myself in the Provider in Triage Sort system due to ED crowding.  Appropriate tests and/or medications ordered.  A medical screening exam has been performed.  The care will be assumed by myself or another provider when treatment space becomes available.  I am not assuming care of this patient at this time. 6:27 PM. GERALD ADAMS        [AM]      ED Course User Index  [AM] Danna Yoon PA-C           MEDS:  Medications - No data to display        IMPRESSION:  1. Hip pain             DISPOSITION  Patient *** in *** condition.        Blood Pressure Follow-Up Advised  Patient advised to follow up with PCP within 3-5 days for blood pressure re-check if blood pressure is  equal to or greater than 120/80.          DISCLAIMER: This note was prepared with [employer] voice recognition transcription software. Garbled syntax, mangled pronouns, and other bizarre constructions may be attributed to that software system.         No